# Patient Record
Sex: MALE | Race: AMERICAN INDIAN OR ALASKA NATIVE
[De-identification: names, ages, dates, MRNs, and addresses within clinical notes are randomized per-mention and may not be internally consistent; named-entity substitution may affect disease eponyms.]

---

## 2018-05-07 ENCOUNTER — HOSPITAL ENCOUNTER (EMERGENCY)
Dept: HOSPITAL 5 - ED | Age: 48
Discharge: HOME | End: 2018-05-07
Payer: COMMERCIAL

## 2018-05-07 VITALS — SYSTOLIC BLOOD PRESSURE: 152 MMHG | DIASTOLIC BLOOD PRESSURE: 78 MMHG
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
## 2018-05-07 DIAGNOSIS — S16.1XXA: Primary | ICD-10-CM

## 2018-05-07 DIAGNOSIS — Z88.1: ICD-10-CM
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
## 2018-05-07 DIAGNOSIS — J45.909: ICD-10-CM
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
## 2018-05-07 DIAGNOSIS — Z87.891: ICD-10-CM
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
## 2018-05-07 PROCEDURE — 72100 X-RAY EXAM L-S SPINE 2/3 VWS: CPT

## 2018-05-07 PROCEDURE — 72040 X-RAY EXAM NECK SPINE 2-3 VW: CPT

## 2018-05-07 NOTE — XRAY REPORT
LUMBOSACRAL SPINE, 3 VIEWS:



History: Back pain



Findings: There is 5 mm anterolisthesis of L3 with respect to L2. This 

appears to be associated with facet arthropathy. No disruption of the 

posterior elements is appreciated. The remaining lumbar vertebra are in 

normal in height and alignment. There are moderate multilevel 

degenerative changes. No acute fracture is appreciated.





Impression:

Lumbar spondylosis. Grade 1/2 anterolisthesis of L3 respect to L2. No 

acute injury appreciated on x-ray.
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
